# Patient Record
Sex: MALE | Race: ASIAN | NOT HISPANIC OR LATINO | URBAN - METROPOLITAN AREA
[De-identification: names, ages, dates, MRNs, and addresses within clinical notes are randomized per-mention and may not be internally consistent; named-entity substitution may affect disease eponyms.]

---

## 2021-09-01 ENCOUNTER — EMERGENCY (EMERGENCY)
Facility: HOSPITAL | Age: 21
LOS: 1 days | Discharge: HOME | End: 2021-09-01
Attending: EMERGENCY MEDICINE | Admitting: EMERGENCY MEDICINE
Payer: MEDICAID

## 2021-09-01 VITALS
TEMPERATURE: 98 F | HEART RATE: 90 BPM | OXYGEN SATURATION: 100 % | RESPIRATION RATE: 19 BRPM | DIASTOLIC BLOOD PRESSURE: 102 MMHG | SYSTOLIC BLOOD PRESSURE: 147 MMHG

## 2021-09-01 VITALS
RESPIRATION RATE: 18 BRPM | TEMPERATURE: 98 F | SYSTOLIC BLOOD PRESSURE: 159 MMHG | OXYGEN SATURATION: 100 % | HEART RATE: 105 BPM | DIASTOLIC BLOOD PRESSURE: 109 MMHG

## 2021-09-01 PROCEDURE — 93010 ELECTROCARDIOGRAM REPORT: CPT

## 2021-09-01 PROCEDURE — 99284 EMERGENCY DEPT VISIT MOD MDM: CPT | Mod: 25

## 2021-09-01 RX ORDER — HYDROXYZINE HCL 10 MG
1 TABLET ORAL
Qty: 12 | Refills: 0
Start: 2021-09-01 | End: 2021-09-04

## 2021-09-01 RX ORDER — DIAZEPAM 5 MG
1 TABLET ORAL
Qty: 3 | Refills: 0
Start: 2021-09-01 | End: 2021-09-01

## 2021-09-01 RX ORDER — DIAZEPAM 5 MG
2 TABLET ORAL ONCE
Refills: 0 | Status: DISCONTINUED | OUTPATIENT
Start: 2021-09-01 | End: 2021-09-01

## 2021-09-01 RX ADMIN — Medication 2 MILLIGRAM(S): at 22:13

## 2021-09-01 NOTE — ED PROVIDER NOTE - NSFOLLOWUPINSTRUCTIONS_ED_ALL_ED_FT
You have been given information necessary to follow up with the  [facility] ([facility]) Crisis center & other outpatient  psychiatric clinics within your community    • [facility] walk in Crisis centre  [id number] [place] [address] [phone] [email or web address]  Hours of operation:  Day	                                        Hours  Sunday                                  Closed  Monday                                9am - 3pm  Tuesday                                9am - 3pm  Wednesday                          9am - 3pm  Thursday                               9am - 3pm  Friday                                    9am - 3pm  Saturday                                Closed    .....additionally if your current problem is associated with drug or alcohol abuse further information can be obtained at the Drug Abuse Evaluation [facility] Referral [facility] ([facility])    • [facility] clinic [id number] [place] [address] [phone] [email or web address]    Additionally if more support and information and help is needed in the area of suicide prevention pleas3 feel free to contact :   • [place] [address]  Phone: 0-437-237-PLBL (8489)  Web Address: http://www.suicidepreventionlifeline.org  • Suicide Awareness Voices of Education  [address] [place] [place], [place] [id number]  Phone: 1-227.229.9313  Web Address: http://www.save.org    Insomnia    WHAT YOU NEED TO KNOW:    Insomnia is a condition that makes it hard to fall or stay asleep. Lack of sleep can lead to attention or memory problems during the day. You may also be zavala, depressed, clumsy, or have headaches.    DISCHARGE INSTRUCTIONS:    Contact your healthcare provider if:    Your symptoms do not get better, or they get worse.    You begin to use drugs or alcohol to fall asleep.    You have questions or concerns about your condition or care.  Medicines:    Medicines may help you sleep more regularly or help you feel less anxious.    Take your medicine as directed. Contact your healthcare provider if you think your medicine is not helping or if you have side effects. Tell him or her if you are allergic to any medicine. Keep a list of the medicines, vitamins, and herbs you take. Include the amounts, and when and why you take them. Bring the list or the pill bottles to follow-up visits. Carry your medicine list with you in case of an emergency.  What you can do to improve your sleep:    Create a sleep schedule. Try to go to sleep and wake up at the same times every day. Keep a record of your sleep patterns, and any sleeping problems you have. Bring the record to follow-up visits with healthcare providers.    Do not take naps. Naps could make it hard for you to fall asleep at bedtime.    Keep your bedroom cool, quiet, and dark. Turn on white noise, such as a fan, to help you relax. Do not use your bed for any activity that will keep you awake. Do not read, exercise, eat, or watch TV in your bedroom.    Get up if you do not fall asleep within 20 minutes. Move to another room and do something relaxing until you become sleepy.    Limit caffeine, alcohol, and food to earlier in the day. Only drink caffeine in the morning. Do not drink alcohol within 6 hours of bedtime. Do not eat a heavy meal right before you go to bed.    Exercise regularly. Daily exercise may help you sleep better. Do not exercise within 4 hours of bedtime.  Follow up with your healthcare provider as directed: Your healthcare provider may refer you for cognitive behavioral therapy. A behavioral therapist may help you find ways to relax, decrease stress, and improve sleep. Write down your questions so you remember to ask them during your visits.

## 2021-09-01 NOTE — ED PROVIDER NOTE - PATIENT PORTAL LINK FT
You can access the FollowMyHealth Patient Portal offered by [facility] by registering at the following website: [email or web address]. By joining [facility]’s FollowMyHealth portal, you will also be able to view your health information using other applications (apps) compatible with our system.

## 2021-09-01 NOTE — ED PROVIDER NOTE - PHYSICAL EXAMINATION
Vital signs reviewed.   CONSTITUTIONAL: Well-appearing; well-nourished; in no apparent distress. Non-toxic appearing.   HEAD: Normocephalic, atraumatic.  EYES: PERRL, EOM intact, conjunctiva and no sclera injection noted  ENT: normal nose; no rhinorrhea  CARD: Normal S1, S2  RESP: Normal chest excursion with respiration; breath sounds clear and equal bilaterally  ABD/GI: soft, non-distended; non-tender  EXT/MS: moves all extremities; distal pulses are normal, no pedal edema.  SKIN: Normal for age and race; warm; dry; good turgor; no apparent lesions or exudate noted.  NEURO: Awake, alert, oriented x 3, no gross deficits, CN II-XII grossly intact, no motor or sensory deficit noted.  PSYCH: Normal mood; appropriate affect. NO SI/HI

## 2021-09-01 NOTE — ED ADULT NURSE NOTE - OBJECTIVE STATEMENT
20 y/o M received to intake room 10c for insomnia. pt states he has been unable to sleep since Sunday. Pt respirations even and unlabored. VS as noted, medicated as per emar. will continue to monitor.

## 2021-09-01 NOTE — ED PROVIDER NOTE - CLINICAL SUMMARY MEDICAL DECISION MAKING FREE TEXT BOX
20 Y/O M w/ no PMH presents to ER for insomnia.   Referral to [facility] provided  Sent RX medications  Return precautions provided

## 2021-09-01 NOTE — ED PROVIDER NOTE - NS ED ROS FT
Constitutional: (-) fever (+) insomnia  Head: Normal cephalic, Atraumatic  Cardiovascular: (+) chest pressure, (-) wheezing  Respiratory: (-) cough, (-) shortness of breath  Gastrointestinal: (-) vomiting, (-) diarrhea, (-) abdominal pain  : (-) dysuria   Musculoskeletal: (-) back pain  Integumentary: (-) rash, (-) edema  Neurological: (-)loc  Allergic/Immunologic: (-) pruritus

## 2021-09-01 NOTE — ED PROVIDER NOTE - OBJECTIVE STATEMENT
20 Y/O M w/ no PMH presents to ER for insomnia. Admits to inability to initiate or maintain sleep for the past 4 days. Taking Melatonin w/ minimal relief. Pt appear internally occupied. When questioned as to why states he is thinking about things. Wife is not speaking to him, distant and leaves home without notifying him. Last spoke to her 2 days ago. While in triage admitted to midsternal chest pressure. Did not use ETOH or admit to substance use. No hx of self harm or psychiatric admissions. Denies fever, nausea/vomiting, dizziness, headache, palpitations, syncope, SI/HI

## 2021-09-01 NOTE — ED ADULT TRIAGE NOTE - CHIEF COMPLAINT QUOTE
Pt with no PMH, c/o midsternal chest pain/pressure and SOB for the past 3 days. Symptoms intermittent. Pt also reports problem concentrating and sleeping.

## 2021-09-01 NOTE — ED PROVIDER NOTE - ATTENDING CONTRIBUTION TO CARE
I have seen and examined the patient on the patient´s visit date. I have reviewed the note written by Mathieu Klein  [facility], on that visit day. I have supervised and participated as necessary in the performance of procedures indicated for patient management and was available at all phases of the patient´s visit when needed. We discussed the history, physical exam findings, management plan, and  medical decision making. I have made my additions, exceptions, and revisions within the chart and I agree with H and P as documented in its entirety. The data and my interpretation of any data collected from labs, interventions and imaging appear below as well as my independent medical decision making and considerations    The patient is a 21y Male who has no prior past medical and surgery history of PTED with midsternal chest pain/pressure and SOB for the past 3 days. Symptoms intermittent and patient with no s/s during evaluation. Pt also reports problem concentrating and sleeping and wishes to be seen by psychiatry. No SI/HI .   Vital Signs Stable  PE: as described; my additions and exceptions are noted in the chart    DATA:  EKG:   LAB: Pending at time of evaluation     IMPRESSION/RISK:  Dx=Anxiety ? depression   Consideration include: s/s control until definitive psych eval/management  Plan  as above  RTED PRN I have seen and examined the patient on the patient´s visit date. I have reviewed the note written by Mathieu Klein  [facility], on that visit day. I have supervised and participated as necessary in the performance of procedures indicated for patient management and was available at all phases of the patient´s visit when needed. We discussed the history, physical exam findings, management plan, and  medical decision making. I have made my additions, exceptions, and revisions within the chart and I agree with H and P as documented in its entirety. The data and my interpretation of any data collected from labs, interventions and imaging appear below as well as my independent medical decision making and considerations    The patient is a 21y Male who has no prior past medical and surgery history of PTED with midsternal chest pain/pressure and SOB for the past 3 days. Symptoms intermittent and patient with no s/s during evaluation. Pt also reports problem concentrating and sleeping and wishes to be seen by psychiatry. No SI/HI .   Vital Signs Stable  PE: as described; my additions and exceptions are noted in the chart    DATA:    LAB: Pending at time of evaluation     IMPRESSION/RISK:  Dx=Anxiety ? depression   Consideration include: s/s control until definitive psych eval/management  Plan  as above  RTED PRN

## 2021-09-10 ENCOUNTER — OUTPATIENT (OUTPATIENT)
Dept: OUTPATIENT SERVICES | Facility: HOSPITAL | Age: 21
LOS: 1 days | Discharge: HOME | End: 2021-09-10
Payer: MEDICARE

## 2021-09-10 PROCEDURE — 99214 OFFICE O/P EST MOD 30 MIN: CPT

## 2021-09-13 DIAGNOSIS — F32.3 MAJOR DEPRESSIVE DISORDER, SINGLE EPISODE, SEVERE WITH PSYCHOTIC FEATURES: ICD-10-CM

## 2021-09-17 PROCEDURE — 99214 OFFICE O/P EST MOD 30 MIN: CPT

## 2021-10-01 PROCEDURE — 99214 OFFICE O/P EST MOD 30 MIN: CPT | Mod: 95

## 2021-11-15 PROCEDURE — 99214 OFFICE O/P EST MOD 30 MIN: CPT

## 2021-11-15 PROCEDURE — 99214 OFFICE O/P EST MOD 30 MIN: CPT | Mod: 95
